# Patient Record
Sex: MALE | Race: WHITE | NOT HISPANIC OR LATINO | Employment: FULL TIME | URBAN - METROPOLITAN AREA
[De-identification: names, ages, dates, MRNs, and addresses within clinical notes are randomized per-mention and may not be internally consistent; named-entity substitution may affect disease eponyms.]

---

## 2017-01-31 ENCOUNTER — OFFICE VISIT (OUTPATIENT)
Dept: INTERNAL MEDICINE | Facility: CLINIC | Age: 62
End: 2017-01-31

## 2017-01-31 VITALS
DIASTOLIC BLOOD PRESSURE: 90 MMHG | HEIGHT: 70 IN | BODY MASS INDEX: 30.06 KG/M2 | HEART RATE: 73 BPM | WEIGHT: 210 LBS | SYSTOLIC BLOOD PRESSURE: 142 MMHG

## 2017-01-31 DIAGNOSIS — E78.5 HYPERLIPIDEMIA, UNSPECIFIED HYPERLIPIDEMIA TYPE: Primary | ICD-10-CM

## 2017-01-31 DIAGNOSIS — J06.9 UPPER RESPIRATORY TRACT INFECTION, UNSPECIFIED TYPE: ICD-10-CM

## 2017-01-31 DIAGNOSIS — R05.9 COUGH: ICD-10-CM

## 2017-01-31 DIAGNOSIS — I10 ESSENTIAL HYPERTENSION: ICD-10-CM

## 2017-01-31 PROBLEM — J45.20 MILD INTERMITTENT ASTHMA: Status: ACTIVE | Noted: 2017-01-31

## 2017-01-31 PROCEDURE — 99214 OFFICE O/P EST MOD 30 MIN: CPT | Performed by: INTERNAL MEDICINE

## 2017-01-31 RX ORDER — LOSARTAN POTASSIUM 100 MG/1
100 TABLET ORAL DAILY
Qty: 90 TABLET | Refills: 2 | Status: SHIPPED | OUTPATIENT
Start: 2017-01-31

## 2017-01-31 RX ORDER — GUAIFENESIN AND CODEINE PHOSPHATE 100; 10 MG/5ML; MG/5ML
5 SOLUTION ORAL 3 TIMES DAILY PRN
Qty: 236 ML | Refills: 0 | Status: SHIPPED | OUTPATIENT
Start: 2017-01-31

## 2017-01-31 RX ORDER — ATORVASTATIN CALCIUM 10 MG/1
10 TABLET, FILM COATED ORAL DAILY
Qty: 90 TABLET | Refills: 2 | Status: SHIPPED | OUTPATIENT
Start: 2017-01-31

## 2017-01-31 NOTE — PROGRESS NOTES
Subjective   Parviz Davidson is a 62 y.o. male.     Chief Complaint   Patient presents with   • Hypertension   • Hyperlipidemia   • Asthma       History of Present Illness  F/u HTN. BP normally 120s/80s at home per patient report.  He forgot to take his blood pressure medicine this morning.  Denies CP, HA, blurry vision, SOA.  Needs a refill on her losartan.  Is doing well without SE's.  F/u HLD. Needs Lipitor RF's . No fatigue or myalgias. Last FLP normal.  C/o cough for the last 2-3 weeks.  He also has nasal congestion but no fever or chills.  He has been using over-the-counter cough suppressant but no other medications, and avoids decongestions due to his hypertension.  He states that he always has these symptoms at this time of the year.  He was on Allegra until end of December.  He does have postnasal drip.    Review of Systems   Constitutional: Negative for chills and fatigue.   Respiratory: Negative for shortness of breath.    Cardiovascular: Negative for chest pain and leg swelling.   Gastrointestinal: Negative for abdominal distention, abdominal pain, constipation and diarrhea.   Psychiatric/Behavioral: Negative for sleep disturbance and suicidal ideas. The patient is not nervous/anxious.    All other systems reviewed and are negative.      The following portions of the patient's history were reviewed and updated as appropriate: past family history, past medical history, past social history and past surgical history.    Objective   Physical Exam   Constitutional: He is oriented to person, place, and time. He appears well-developed and well-nourished. No distress.   HENT:   Head: Normocephalic and atraumatic.   Nose: Mucosal edema present. No rhinorrhea. Right sinus exhibits no maxillary sinus tenderness and no frontal sinus tenderness. Left sinus exhibits no maxillary sinus tenderness and no frontal sinus tenderness.   Mouth/Throat: Oropharynx is clear and moist. No oropharyngeal exudate or posterior  oropharyngeal edema.   Cardiovascular: Normal rate, regular rhythm and normal heart sounds.    No murmur heard.  Pulmonary/Chest: Effort normal and breath sounds normal. No respiratory distress. He has no wheezes.   Abdominal: Soft. Bowel sounds are normal. He exhibits no distension. There is no tenderness.   Musculoskeletal: He exhibits no edema.   Neurological: He is alert and oriented to person, place, and time.   Skin: He is not diaphoretic.   Psychiatric: He has a normal mood and affect. His behavior is normal.   Nursing note and vitals reviewed.      Assessment/Plan   Parviz was seen today for hypertension, hyperlipidemia and asthma.    Diagnoses and all orders for this visit:    Hyperlipidemia, unspecified hyperlipidemia type  -     atorvastatin (LIPITOR) 10 MG tablet; Take 1 tablet by mouth Daily.    Essential hypertension  -     losartan (COZAAR) 100 MG tablet; Take 1 tablet by mouth Daily.    Upper respiratory tract infection, unspecified type    Cough  -     guaifenesin-codeine (GUAIFENESIN AC) 100-10 MG/5ML liquid; Take 5 mL by mouth 3 (Three) Times a Day As Needed for cough.    -HLD: Doing well on Lipitor.  Continue same.  -HTN:  BP controlled at home.  Continue losartan 100 mg daily.  Patient is to monitor his blood pressure at home, and make sure it stays below 140/90.  Low-sodium, low caffeine diet and exercise recommended.  -Cough: This may be related to postnasal drip, and his nasal congestion may be due to allergies.  This is likely a result of cessation of Allegra.  Patient will start her Allegra, and if not effective he may switch to Zyrtec 10 mg daily at bedtime.  I have also given him guaifenesin with codeine, which she can use if the Zyrtec does not make him too sleepy.  I also recommended Nettipot use 4 times a day.  Call within the next 5-7 days if not better.    As part of this patient's treatment plan I am prescribing controlled substances. The patient has been made aware of appropriate use  of such medications, including potential risk of somnolence, limited ability to drive and/or work safely, and potential for dependence or overdose. It has also been made clear that these medications are for use by this patient only, without concomitant use of alcohol or other substances unless prescribed.   Patient has completed prescribing agreement detailing terms of continued prescribing of controlled substances, including monitoring EDWARDO reports, urine drug screening, and pill counts if necessary. The patient is aware that inappropriate use will result in cessation of prescribing such medications.  Edwardo report dated on 01/29/2017 has been reviewed and signed. It has been scanned into the chart.  History and physical exam exam exhibit continued safe and appropriate use of controlled substances.

## 2017-01-31 NOTE — MR AVS SNAPSHOT
Parviz Davidson   1/31/2017 11:20 AM   Office Visit    Dept Phone:  [phone]   Encounter #:  [id number]    Provider:  Oma Bender MD   Department:  [facility] INTERNAL MEDICINE                Your Full Care Plan              Today's Medication Changes          These changes are accurate as of: 1/31/17 11:47 AM.  If you have any questions, ask your nurse or doctor.               New Medication(s)Ordered:     guaifenesin-codeine 100-10 MG/5ML liquid   Commonly known as:  GUAIFENESIN AC   Take 5 mL by mouth 3 (Three) Times a Day As Needed for cough.   Started by:  Oma Bender MD         Medication(s)that have changed:     atorvastatin 10 MG tablet   Commonly known as:  LIPITOR   Take 1 tablet by mouth Daily.   What changed:  See the new instructions.   Changed by:  Oma Bender MD         Stop taking medication(s)listed here:     vitamin D 76168 UNITS capsule capsule   Commonly known as:  ERGOCALCIFEROL   Stopped by:  Oma Bender MD                Where to Get Your Medications      These medications were sent to [facility] [address] [phone] [facility] [phone] [address] [address]     Phone:  [phone]     atorvastatin 10 MG tablet    losartan 100 MG tablet         You can get these medications from any pharmacy     Bring a paper prescription for each of these medications     guaifenesin-codeine 100-10 MG/5ML liquid                  Your Updated Medication List          This list is accurate as of: 1/31/17 11:47 AM.  Always use your most recent med list.                aspirin 81 MG tablet       atorvastatin 10 MG tablet   Commonly known as:  LIPITOR   Take 1 tablet by mouth Daily.       fluticasone 50 MCG/ACT nasal spray   Commonly known as:  FLONASE       guaifenesin-codeine 100-10 MG/5ML liquid   Commonly known as:  GUAIFENESIN AC   Take 5 mL by mouth 3 (Three) Times a Day As Needed for  "cough.       losartan 100 MG tablet   Commonly known as:  COZAAR   Take 1 tablet by mouth Daily.       PRILOSEC 20 MG capsule   Generic drug:  omeprazole       Vitamin D (Cholecalciferol) 1000 UNITS capsule       vitamin E 100 UNIT capsule               You Were Diagnosed With        Codes Comments    Hyperlipidemia, unspecified hyperlipidemia type    -  Primary ICD-10-CM: E78.5  ICD-9-CM: 272.4     Essential hypertension     ICD-10-CM: I10  ICD-9-CM: 401.9     Upper respiratory tract infection, unspecified type     ICD-10-CM: J06.9  ICD-9-CM: 465.9     Cough     ICD-10-CM: R05  ICD-9-CM: 786.2       Instructions     None    Patient Instructions History      Upcoming Appointments     Visit Type Date Time Department    OFFICE VISIT 2017 11:20 AM [email or web address] Signup     [facility] batterii allows you to send messages to your doctor, view your test results, renew your prescriptions, schedule appointments, and more. To sign up, go to [employer] and click on the Sign Up Now link in the New User? box. Enter your batterii Activation Code exactly as it appears below along with the last four digits of your Social Security Number and your Date of Birth () to complete the sign-up process. If you do not sign up before the expiration date, you must request a new code.    batterii Activation Code: [email or web address]  Expires: 2017 11:47 AM    If you have questions, you can email [email or web address] or call [phone] to talk to our batterii staff. Remember, batterii is NOT to be used for urgent needs. For medical emergencies, dial 911.               Other Info from Your Visit           Allergies     Penicillins        Reason for Visit     Hypertension     Hyperlipidemia     Asthma           Vital Signs     Blood Pressure Pulse Height Weight Body Mass Index Smoking Status    142/90 (BP Location: Left arm, Patient Position: Sitting, Cuff Size: Adult) 73 70\" (177.8 cm) 210 lb (95.3 " kg) 30.13 kg/m2 Never Smoker      Problems and Diagnoses Noted     High blood pressure    High cholesterol or triglycerides    Mild intermittent asthma    Upper respiratory infection        Cough

## 2021-03-05 ENCOUNTER — IMMUNIZATION (OUTPATIENT)
Dept: VACCINE CLINIC | Facility: HOSPITAL | Age: 66
End: 2021-03-05

## 2021-03-05 PROCEDURE — 0001A: CPT | Performed by: INTERNAL MEDICINE

## 2021-03-05 PROCEDURE — 91300 HC SARSCOV02 VAC 30MCG/0.3ML IM: CPT | Performed by: INTERNAL MEDICINE

## 2021-03-26 ENCOUNTER — IMMUNIZATION (OUTPATIENT)
Dept: VACCINE CLINIC | Facility: HOSPITAL | Age: 66
End: 2021-03-26

## 2021-03-26 PROCEDURE — 0002A: CPT | Performed by: INTERNAL MEDICINE

## 2021-03-26 PROCEDURE — 91300 HC SARSCOV02 VAC 30MCG/0.3ML IM: CPT | Performed by: INTERNAL MEDICINE

## 2021-10-12 ENCOUNTER — IMMUNIZATION (OUTPATIENT)
Dept: VACCINE CLINIC | Facility: HOSPITAL | Age: 66
End: 2021-10-12

## 2021-10-12 PROCEDURE — 91300 HC SARSCOV02 VAC 30MCG/0.3ML IM: CPT | Performed by: INTERNAL MEDICINE

## 2021-10-12 PROCEDURE — 0004A ADM SARSCOV2 30MCG/0.3ML BOOSTER: CPT | Performed by: INTERNAL MEDICINE
